# Patient Record
Sex: MALE | Employment: FULL TIME | URBAN - METROPOLITAN AREA
[De-identification: names, ages, dates, MRNs, and addresses within clinical notes are randomized per-mention and may not be internally consistent; named-entity substitution may affect disease eponyms.]

---
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
## 2018-01-15 ENCOUNTER — HOSPITAL ENCOUNTER (EMERGENCY)
Facility: HOSPITAL | Age: 18
Discharge: HOME | End: 2018-01-15
Attending: PHYSICIAN ASSISTANT
Payer: COMMERCIAL

## 2018-01-15 VITALS
DIASTOLIC BLOOD PRESSURE: 65 MMHG | BODY MASS INDEX: 25.18 KG/M2 | HEIGHT: 69 IN | HEART RATE: 66 BPM | WEIGHT: 170 LBS | TEMPERATURE: 98 F | OXYGEN SATURATION: 99 % | RESPIRATION RATE: 18 BRPM | SYSTOLIC BLOOD PRESSURE: 124 MMHG

## 2018-01-15 DIAGNOSIS — Z48.02 ENCOUNTER FOR REMOVAL OF SUTURES: Primary | ICD-10-CM

## 2018-01-15 DIAGNOSIS — S01.511D LIP LACERATION, SUBSEQUENT ENCOUNTER: ICD-10-CM

## 2018-01-15 PROCEDURE — 99282 EMERGENCY DEPT VISIT SF MDM: CPT

## 2018-01-15 RX ORDER — IBUPROFEN 600 MG/1
TABLET ORAL
Qty: 20 TABLET | Refills: 0 | Status: SHIPPED | OUTPATIENT
Start: 2018-01-15

## 2018-01-16 NOTE — ED PROVIDER NOTES
Patient Seen in: [facility] AND [facility] Emergency Department    History   Patient presents with:  Sut Stap Kali (ingtegumentary)    Stated Complaint: stitch removal     HPI    78-year-old male presents to the emergency department for suture removal.  P 175.3 cm (5' 9\")   Wt 77.1 kg   SpO2 99%   BMI 25.10 kg/m²    PULSE OX within normal limits on room air as interpreted by this provider. Constitutional: The patient is cooperative. Appears well-developed and well-nourished. No acute distress.   Psychol Impression:  Encounter for removal of sutures  (primary encounter diagnosis)  Lip laceration, subsequent encounter    Disposition:  Discharge    Follow-up:  Your primary care physician, infectious disease specialist    Schedule an appointment as soon as po

## (undated) NOTE — ED AVS SNAPSHOT
Shena Rodriges   MRN: [id number]    Department:  [facility] Emergency Department   Date of Visit:  1/15/2018           Disclosure     Insurance plans vary and the physician(s) referred by the ER may not be covered by your plan.  Please contact yo CARE PHYSICIAN AT ONCE OR RETURN IMMEDIATELY TO THE EMERGENCY DEPARTMENT. If you have been prescribed any medication(s), please fill your prescription right away and begin taking the medication(s) as directed.   If you believe that any of the medications